# Patient Record
Sex: FEMALE | Race: WHITE | Employment: FULL TIME | URBAN - NONMETROPOLITAN AREA
[De-identification: names, ages, dates, MRNs, and addresses within clinical notes are randomized per-mention and may not be internally consistent; named-entity substitution may affect disease eponyms.]

---

## 2022-05-05 ENCOUNTER — HOSPITAL ENCOUNTER (EMERGENCY)
Age: 22
Discharge: HOME | End: 2022-05-05

## 2022-05-05 VITALS
BODY MASS INDEX: 25.78 KG/M2 | HEART RATE: 78 BPM | HEIGHT: 64 IN | DIASTOLIC BLOOD PRESSURE: 74 MMHG | TEMPERATURE: 98.6 F | WEIGHT: 151 LBS | OXYGEN SATURATION: 99 % | RESPIRATION RATE: 16 BRPM | SYSTOLIC BLOOD PRESSURE: 113 MMHG

## 2022-05-05 DIAGNOSIS — Z02.1 PHYSICAL EXAM, PRE-EMPLOYMENT: Primary | ICD-10-CM

## 2022-05-05 PROCEDURE — 9900000020 HC SPORTS PHYSICAL-SELF PAY

## 2022-05-05 PROCEDURE — 99999 PR OFFICE/OUTPT VISIT,PROCEDURE ONLY: CPT | Performed by: EMERGENCY MEDICINE

## 2022-05-05 ASSESSMENT — ENCOUNTER SYMPTOMS
SHORTNESS OF BREATH: 0
RHINORRHEA: 0
ABDOMINAL PAIN: 0
COUGH: 0
SINUS PAIN: 0
SINUS PRESSURE: 0

## 2022-05-05 NOTE — ED PROVIDER NOTES
[facility]       CHIEF COMPLAINT       Chief Complaint   Patient presents with    Employment Physical       Nurses Notes reviewed and I agree except as noted in the HPI. HISTORY OF PRESENT ILLNESS   Beatriz Fajardo is a 22 y.o. female who presents for preemployment physical.  Patient states that she is a traveling [place]. She has been an [employer] for 2 years. She feels that she is capable to continue performing the job. She has never developed chest pain, shortness of breath, knee, back or joint pain from performing this type of labor. She denies any known medical conditions. She only takes oral contraceptive pills, no other medications. HPI    REVIEW OF SYSTEMS     Review of Systems   Constitutional: Negative for diaphoresis, fatigue and fever. HENT: Negative for rhinorrhea, sinus pressure and sinus pain. Respiratory: Negative for cough and shortness of breath. Cardiovascular: Negative for chest pain. Gastrointestinal: Negative for abdominal pain. Skin: Negative for rash. Neurological: Negative for dizziness. Psychiatric/Behavioral: Negative for behavioral problems. PAST MEDICAL HISTORY   History reviewed. No pertinent past medical history. SURGICALHISTORY     Patient  has no past surgical history on file. CURRENT MEDICATIONS       Previous Medications    NORETHINDRONE ACET-ETHINYL EST (BARBARA 1/20 PO)    Take by mouth       ALLERGIES     Patient is is allergic to pcn [penicillins]. Patients   There is no immunization history on file for this patient. FAMILY HISTORY     Patient's family history is not on file. SOCIAL HISTORY     Patient  reports that she has never smoked. She has never used smokeless tobacco. She reports that she does not drink alcohol.     PHYSICAL EXAM     ED TRIAGE VITALS  BP: 113/74, Temp: 98.6 °F (37 °C), Pulse: 78, Resp: 16, SpO2: 99 %,Estimated body mass index is 25.92 kg/m² as calculated from the following:    Height as of this encounter: 5' 4\" (1.626 m). Weight as of this encounter: 151 lb (68.5 kg). ,Patient's last menstrual period was 04/28/2022. Physical Exam  Constitutional:       General: She is not in acute distress. Appearance: She is normal weight. She is not ill-appearing. HENT:      Nose: Nose normal.      Mouth/Throat:      Mouth: Mucous membranes are moist.   Cardiovascular:      Rate and Rhythm: Normal rate and regular rhythm. Pulses: Normal pulses. Heart sounds: Normal heart sounds. No murmur heard. Pulmonary:      Effort: Pulmonary effort is normal.      Breath sounds: Normal breath sounds. Abdominal:      General: Abdomen is flat. Bowel sounds are normal.      Palpations: Abdomen is soft. Tenderness: There is no abdominal tenderness. Musculoskeletal:      Cervical back: Normal.      Thoracic back: Normal.      Lumbar back: Normal.      Right hip: Normal.      Left hip: Normal.      Right knee: Normal.      Left knee: Normal.      Comments: Able to perform 2 legged squat, 1 leg squat with stability. Able to touch toes. No curvature of back noted. No back pain or tenderness   Skin:     General: Skin is warm and dry. Findings: No rash. Neurological:      General: No focal deficit present. Mental Status: She is alert. Psychiatric:         Mood and Affect: Mood normal.         Behavior: Behavior normal.         DIAGNOSTIC RESULTS     Labs:No results found for this visit on 05/05/22. IMAGING:    No orders to display         EKG:      URGENT CARE COURSE:     Vitals:    05/05/22 1336   BP: 113/74   Pulse: 78   Resp: 16   Temp: 98.6 °F (37 °C)   TempSrc: Infrared   SpO2: 99%   Weight: 151 lb (68.5 kg)   Height: 5' 4\" (1.626 m)       Medications - No data to display         PROCEDURES:  None    FINAL IMPRESSION      1. Physical exam, pre-employment          DISPOSITION/ PLAN     Presents for physical exam for employment.   This is a self-pay physical exam.  Physical exam shows no concerns to prevent her from performing this type of work. Required paperwork is filled out. PATIENT REFERRED TO:  No primary care provider on file. No primary physician on file.       DISCHARGE MEDICATIONS:  New Prescriptions    No medications on file       Discontinued Medications    No medications on file       Current Discharge Medication List          AGUSTO Deleon - CNP    (Please note that portions of this note were completed with a voice recognition program. Efforts were made to edit the dictations but occasionally words are mis-transcribed.)          AGUSTO Deleon CNP  05/05/22 1400